# Patient Record
Sex: FEMALE
[De-identification: names, ages, dates, MRNs, and addresses within clinical notes are randomized per-mention and may not be internally consistent; named-entity substitution may affect disease eponyms.]

---

## 2024-01-11 PROBLEM — Z00.00 ENCOUNTER FOR PREVENTIVE HEALTH EXAMINATION: Status: ACTIVE | Noted: 2024-01-11

## 2024-05-06 ENCOUNTER — APPOINTMENT (OUTPATIENT)
Dept: OPHTHALMOLOGY | Facility: CLINIC | Age: 59
End: 2024-05-06

## 2024-05-13 ENCOUNTER — APPOINTMENT (OUTPATIENT)
Dept: NEUROLOGY | Facility: CLINIC | Age: 59
End: 2024-05-13

## 2024-06-18 ENCOUNTER — NON-APPOINTMENT (OUTPATIENT)
Age: 59
End: 2024-06-18

## 2024-06-19 ENCOUNTER — APPOINTMENT (OUTPATIENT)
Dept: NEUROLOGY | Facility: CLINIC | Age: 59
End: 2024-06-19
Payer: MEDICARE

## 2024-06-19 VITALS
SYSTOLIC BLOOD PRESSURE: 129 MMHG | WEIGHT: 167 LBS | HEIGHT: 62 IN | DIASTOLIC BLOOD PRESSURE: 87 MMHG | HEART RATE: 74 BPM | BODY MASS INDEX: 30.73 KG/M2

## 2024-06-19 DIAGNOSIS — Z98.890 OTHER SPECIFIED POSTPROCEDURAL STATES: ICD-10-CM

## 2024-06-19 DIAGNOSIS — Z80.0 FAMILY HISTORY OF MALIGNANT NEOPLASM OF DIGESTIVE ORGANS: ICD-10-CM

## 2024-06-19 DIAGNOSIS — K57.90 DIVERTICULOSIS OF INTESTINE, PART UNSPECIFIED, W/OUT PERFORATION OR ABSCESS W/OUT BLEEDING: ICD-10-CM

## 2024-06-19 DIAGNOSIS — F41.9 ANXIETY DISORDER, UNSPECIFIED: ICD-10-CM

## 2024-06-19 DIAGNOSIS — M19.90 UNSPECIFIED OSTEOARTHRITIS, UNSPECIFIED SITE: ICD-10-CM

## 2024-06-19 DIAGNOSIS — R42 DIZZINESS AND GIDDINESS: ICD-10-CM

## 2024-06-19 DIAGNOSIS — Z78.9 OTHER SPECIFIED HEALTH STATUS: ICD-10-CM

## 2024-06-19 DIAGNOSIS — K21.9 GASTRO-ESOPHAGEAL REFLUX DISEASE W/OUT ESOPHAGITIS: ICD-10-CM

## 2024-06-19 DIAGNOSIS — F32.A ANXIETY DISORDER, UNSPECIFIED: ICD-10-CM

## 2024-06-19 PROCEDURE — 99204 OFFICE O/P NEW MOD 45 MIN: CPT

## 2024-06-19 RX ORDER — TIMOLOL MALEATE 5 MG/ML
0.5 SOLUTION/ DROPS OPHTHALMIC
Refills: 0 | Status: ACTIVE | COMMUNITY

## 2024-06-19 RX ORDER — FLUTICASONE PROPIONATE 50 UG/1
50 SPRAY, METERED NASAL
Refills: 0 | Status: ACTIVE | COMMUNITY

## 2024-06-19 RX ORDER — FEXOFENADINE HYDROCHLORIDE 180 MG/1
180 TABLET, FILM COATED ORAL
Refills: 0 | Status: ACTIVE | COMMUNITY

## 2024-06-19 RX ORDER — BRIMONIDINE TARTRATE 1.5 MG/ML
0.15 SOLUTION/ DROPS OPHTHALMIC
Refills: 0 | Status: ACTIVE | COMMUNITY

## 2024-06-19 RX ORDER — MECLIZINE HYDROCHLORIDE 12.5 MG/1
12.5 TABLET ORAL
Refills: 0 | Status: ACTIVE | COMMUNITY

## 2024-06-19 RX ORDER — FAMOTIDINE 40 MG/1
40 TABLET, FILM COATED ORAL
Refills: 0 | Status: ACTIVE | COMMUNITY

## 2024-06-19 RX ORDER — VITAMIN K2 90 MCG
1000 CAPSULE ORAL
Refills: 0 | Status: ACTIVE | COMMUNITY

## 2024-06-19 RX ORDER — LATANOPROST/PF 0.005 %
0.01 DROPS OPHTHALMIC (EYE)
Refills: 0 | Status: ACTIVE | COMMUNITY

## 2024-06-19 NOTE — HISTORY OF PRESENT ILLNESS
[FreeTextEntry1] : This is a case of a 58 yr right handed female with PMH of anxiety and depression, arthritis, CTS, GERD, glaucoma, diverticulitis presents today with dizziness.    Pt reports dizziness started right after she had cervical surgery in Feb 2, 2022 by Dr. Loomis at [facility].  Pt reports pain started in 2021 and worsened along with stumbling for a couple of months. Pt reports going to see neurosurgery because of the neck pain and balance issues and stumbling.  MRI was taken and was told " her spinal cord was pressing against the nerves".  Pt denies any trauma except a fall in 2000.    After surgery was completed, pt reports about 2 weeks later she reports the dizziness occurred.  Pt reports she feels the room is spinning.  Pt states she can feel like she is "falling over".  It can be exaggerated by quick head movements. Dizziness can happen a couple times a day and about 4 times a week.  Episodes can last for about 5 mins.  Denies n/v.  Denies visual changes, doubled, blurred.  Denies tinnitus, Denies headaches. Denies extremity weakness or slurred speech.  Denies numbness/ tingling in extremities.   Pt feels improved from surgery with walking.  She had done PT after surgery but did not do for long time and stopped after.   Last MRI was beginning of this yr from Dr Loomis.  Pt stated her MRI was stable.  (does not have reports on her).    Occupation: retired  sleep: not good - pt has anxiety was seeing psy but he retired  would like another dr.   Allergies: DONA

## 2024-06-19 NOTE — ASSESSMENT
[FreeTextEntry1] :  This is a case of a 58 yr right handed female with PMH of anxiety and depression, arthritis, CTS, GERD, glaucoma, diverticulitis presents today with dizziness.  Will get MRI to r/o any other structural pathology. Pt needs to have records sent over for previous images to be reviewed.    Plan:    {     } referral to psy    {     } MRI brain    {     } Need all imaging from previous neuro   {     } F/u after imaging   The above plan was discussed with Deidre Gould  in great detail. Deidre Gould verbalized understanding and agrees with plan as detailed above. Patient was provided education and counselling on current diagnosis/symptoms. She was advised to call our clinic at [phone] for any new or worsening symptoms, or with any questions or concerns. In case of acute onset of neurological symptoms or worsening presentation, patient was advised to present to nearest emergency room for further evaluation. Deidre Gould expressed understanding and all her questions/concerns were addressed.

## 2024-06-19 NOTE — PHYSICAL EXAM
[General Appearance - Alert] : alert [General Appearance - Well Nourished] : well nourished [General Appearance - Well-Appearing] : healthy appearing [Oriented To Time, Place, And Person] : oriented to person, place, and time [Affect] : the affect was normal [Person] : oriented to person [Place] : oriented to place [Time] : oriented to time [Cranial Nerves Oculomotor (III)] : extraocular motion intact [Motor Tone] : muscle tone was normal in all four extremities [Motor Strength] : muscle strength was normal in all four extremities [Motor Strength Upper Extremities Bilaterally] : strength was normal in both upper extremities [Motor Strength Lower Extremities Bilaterally] : strength was normal in both lower extremities [2+] : Biceps left 2+ [FreeTextEntry1] : limited rotation to left and right  [] : no respiratory distress [Abnormal Walk] : normal gait [Skin Color & Pigmentation] : normal skin color and pigmentation

## 2024-07-15 ENCOUNTER — OUTPATIENT (OUTPATIENT)
Dept: OUTPATIENT SERVICES | Facility: HOSPITAL | Age: 59
LOS: 1 days | End: 2024-07-15

## 2024-07-15 ENCOUNTER — APPOINTMENT (OUTPATIENT)
Dept: MRI IMAGING | Facility: CLINIC | Age: 59
End: 2024-07-15
Payer: MEDICARE

## 2024-07-15 PROCEDURE — 70551 MRI BRAIN STEM W/O DYE: CPT | Mod: 26

## 2024-08-12 ENCOUNTER — APPOINTMENT (OUTPATIENT)
Dept: NEUROLOGY | Facility: CLINIC | Age: 59
End: 2024-08-12
Payer: MEDICARE

## 2024-08-12 VITALS
DIASTOLIC BLOOD PRESSURE: 78 MMHG | WEIGHT: 167 LBS | BODY MASS INDEX: 30.73 KG/M2 | HEART RATE: 64 BPM | SYSTOLIC BLOOD PRESSURE: 142 MMHG | HEIGHT: 62 IN

## 2024-08-12 DIAGNOSIS — F41.9 ANXIETY DISORDER, UNSPECIFIED: ICD-10-CM

## 2024-08-12 DIAGNOSIS — G43.E09 CHRONIC MIGRAINE WITH AURA, NOT INTRACTABLE, WITHOUT STATUS MIGRAINOSUS: ICD-10-CM

## 2024-08-12 DIAGNOSIS — F32.A ANXIETY DISORDER, UNSPECIFIED: ICD-10-CM

## 2024-08-12 PROCEDURE — 99214 OFFICE O/P EST MOD 30 MIN: CPT

## 2024-08-12 NOTE — HISTORY OF PRESENT ILLNESS
[FreeTextEntry1] : This is a case of a 58 yr right handed female with PMH of anxiety and depression, arthritis, CTS, GERD, glaucoma, diverticulitis presents today with dizziness.   Pt is here for follow up. Pt reports dizziness has resolved and she is feeling much better.  Pt also here for MRI results.  Results given in detail to patient.  Pt also wants to trial a new headache medication.  She was given sumatriptan in the past but feels side effects from medication such as nausea and fatigue.  Headaches are not very often, usually 1 every 2 months.  Has nausea denies vomiting, photophobia, phonophobia, or blurred vision, or visual complaints.   Uses sumatriptan but has symptoms of nausea and fatigue.  Denies any new neurological concerns.  Pt also has not yet followed up with psy as of yet.     Interval June 19, 2024 Pt reports dizziness started right after she had cervical surgery in Feb 2, 2022 by Dr. Loomis at [facility]. Pt reports pain started in 2021 and worsened along with stumbling for a couple of months. Pt reports going to see neurosurgery because of the neck pain and balance issues and stumbling. MRI was taken and was told " her spinal cord was pressing against the nerves". Pt denies any trauma except a fall in 2000. After surgery was completed, pt reports about 2 weeks later she reports the dizziness occurred. Pt reports she feels the room is spinning. Pt states she can feel like she is "falling over". It can be exaggerated by quick head movements. Dizziness can happen a couple times a day and about 4 times a week. Episodes can last for about 5 mins. Denies n/v. Denies visual changes, doubled, blurred. Denies tinnitus, Denies headaches. Denies extremity weakness or slurred speech. Denies numbness/ tingling in extremities. Pt feels improved from surgery with walking. She had done PT after surgery but did not do for long time and stopped after.  Last MRI was beginning of this yr from Dr Loomis. Pt stated her MRI was stable. (does not have reports on her).   Occupation: retired sleep: not good - pt has anxiety was seeing psy but he retired would like another dr. Allergies: NKA

## 2024-08-12 NOTE — PHYSICAL EXAM
[General Appearance - Alert] : alert [General Appearance - Well Nourished] : well nourished [Oriented To Time, Place, And Person] : oriented to person, place, and time [Affect] : the affect was normal [Person] : oriented to person [Place] : oriented to place [Time] : oriented to time [Cranial Nerves Optic (II)] : visual acuity intact bilaterally,  visual fields full to confrontation, pupils equal round and reactive to light [Cranial Nerves Facial (VII)] : face symmetrical [Cranial Nerves Vestibulocochlear (VIII)] : hearing was intact bilaterally [Cranial Nerves Accessory (XI - Cranial And Spinal)] : head turning and shoulder shrug symmetric [Motor Tone] : muscle tone was normal in all four extremities [Motor Strength] : muscle strength was normal in all four extremities [Sclera] : the sclera and conjunctiva were normal [Neck Appearance] : the appearance of the neck was normal [] : no respiratory distress [Abnormal Walk] : normal gait [Skin Color & Pigmentation] : normal skin color and pigmentation

## 2024-08-12 NOTE — ASSESSMENT
[FreeTextEntry1] : This is a case of a 58 yr right handed female with PMH of anxiety and depression, arthritis, CTS, GERD, glaucoma, diverticulitis presents today with dizziness. Trial Ubrelvy- pt had SE from sumatriptan   Plan:    {     } trial ubrelvy 100 mg PRN    {     }f/u 6 months    {     }f/u with psy   The above plan was discussed with Deidre Gould in great detail. Deidre Gould verbalized understanding and agrees with plan as detailed above. Patient was provided education and counselling on current diagnosis/symptoms. She was advised to call our clinic at [phone] for any new or worsening symptoms, or with any questions or concerns. In case of acute onset of neurological symptoms or worsening presentation, patient was advised to present to nearest emergency room for further evaluation. Deidre Gould expressed understanding and all her questions/concerns were addressed.

## 2024-08-13 RX ORDER — UBROGEPANT 100 MG/1
100 TABLET ORAL
Qty: 14 | Refills: 0 | Status: ACTIVE | COMMUNITY
Start: 2024-08-12 | End: 1900-01-01

## 2025-02-12 ENCOUNTER — APPOINTMENT (OUTPATIENT)
Dept: NEUROLOGY | Facility: CLINIC | Age: 60
End: 2025-02-12